# Patient Record
Sex: FEMALE | Race: WHITE
[De-identification: names, ages, dates, MRNs, and addresses within clinical notes are randomized per-mention and may not be internally consistent; named-entity substitution may affect disease eponyms.]

---

## 2018-02-21 ENCOUNTER — HOSPITAL ENCOUNTER (OUTPATIENT)
Dept: HOSPITAL 95 - PLD | Age: 63
Discharge: HOME | End: 2018-02-21
Attending: DERMATOLOGY
Payer: COMMERCIAL

## 2018-02-21 DIAGNOSIS — D22.5: Primary | ICD-10-CM

## 2018-04-18 ENCOUNTER — HOSPITAL ENCOUNTER (OUTPATIENT)
Dept: HOSPITAL 95 - LAB SHORT | Age: 63
Discharge: HOME | End: 2018-04-18
Attending: DERMATOLOGY
Payer: COMMERCIAL

## 2018-04-18 DIAGNOSIS — D22.5: Primary | ICD-10-CM

## 2023-07-24 NOTE — NUR
07/24/23 1341 Kevin Millan
ROPIVACAINE 0.5 % 30 ML MIXED & VERIFIED W/ EPI 0.15 ML (1MG/ML) PER
ORDER TO MAKE ROPIVACAINE 0.5% 1:200,000 FOR INJECTION AT OPSITE BY DR DRAKE.
30 ML INJECTED.